# Patient Record
Sex: MALE | URBAN - METROPOLITAN AREA
[De-identification: names, ages, dates, MRNs, and addresses within clinical notes are randomized per-mention and may not be internally consistent; named-entity substitution may affect disease eponyms.]

---

## 2019-10-11 ENCOUNTER — APPOINTMENT (RX ONLY)
Dept: URBAN - METROPOLITAN AREA CLINIC 74 | Facility: CLINIC | Age: 29
Setting detail: DERMATOLOGY
End: 2019-10-11

## 2019-10-11 DIAGNOSIS — Z71.89 OTHER SPECIFIED COUNSELING: ICD-10-CM

## 2019-10-11 DIAGNOSIS — L81.4 OTHER MELANIN HYPERPIGMENTATION: ICD-10-CM

## 2019-10-11 DIAGNOSIS — L42 PITYRIASIS ROSEA: ICD-10-CM

## 2019-10-11 DIAGNOSIS — D22 MELANOCYTIC NEVI: ICD-10-CM

## 2019-10-11 DIAGNOSIS — D485 NEOPLASM OF UNCERTAIN BEHAVIOR OF SKIN: ICD-10-CM

## 2019-10-11 PROBLEM — D22.5 MELANOCYTIC NEVI OF TRUNK: Status: ACTIVE | Noted: 2019-10-11

## 2019-10-11 PROBLEM — D23.62 OTHER BENIGN NEOPLASM OF SKIN OF LEFT UPPER LIMB, INCLUDING SHOULDER: Status: ACTIVE | Noted: 2019-10-11

## 2019-10-11 PROBLEM — D48.5 NEOPLASM OF UNCERTAIN BEHAVIOR OF SKIN: Status: ACTIVE | Noted: 2019-10-11

## 2019-10-11 PROCEDURE — ? TREATMENT REGIMEN

## 2019-10-11 PROCEDURE — ? OBSERVATION AND MEASURE

## 2019-10-11 PROCEDURE — ? OTHER

## 2019-10-11 PROCEDURE — ? COUNSELING

## 2019-10-11 PROCEDURE — 99203 OFFICE O/P NEW LOW 30 MIN: CPT

## 2019-10-11 ASSESSMENT — SEVERITY ASSESSMENT: SEVERITY: MODERATE

## 2019-10-11 ASSESSMENT — LOCATION SIMPLE DESCRIPTION DERM
LOCATION SIMPLE: RIGHT UPPER BACK
LOCATION SIMPLE: ABDOMEN
LOCATION SIMPLE: LEFT UPPER BACK

## 2019-10-11 ASSESSMENT — LOCATION DETAILED DESCRIPTION DERM
LOCATION DETAILED: RIGHT LATERAL ABDOMEN
LOCATION DETAILED: RIGHT SUPERIOR UPPER BACK
LOCATION DETAILED: RIGHT RIB CAGE
LOCATION DETAILED: LEFT SUPERIOR UPPER BACK
LOCATION DETAILED: PERIUMBILICAL SKIN
LOCATION DETAILED: RIGHT INFERIOR MEDIAL UPPER BACK

## 2019-10-11 ASSESSMENT — LOCATION ZONE DERM: LOCATION ZONE: TRUNK

## 2019-10-11 NOTE — HPI: EVALUATION OF SKIN LESION(S)
What Type Of Note Output Would You Prefer (Optional)?: Standard Output
How Severe Are Your Spot(S)?: moderate
Have Your Spot(S) Been Treated In The Past?: has not been treated
Hpi Title: Evaluation of Skin Lesions
Additional History: Patient would like moles examined and notes a new papule on his left dorsal hand.

## 2019-10-11 NOTE — PROCEDURE: MIPS QUALITY
Quality 131: Pain Assessment And Follow-Up: Pain assessment using a standardized tool is documented as negative, no follow-up plan required
Quality 130: Documentation Of Current Medications In The Medical Record: Current Medications Documented
Quality 110: Preventive Care And Screening: Influenza Immunization: Influenza Immunization Administered during Influenza season
Detail Level: Detailed
Quality 226: Preventive Care And Screening: Tobacco Use: Screening And Cessation Intervention: Patient screened for tobacco use and is an ex/non-smoker

## 2019-10-11 NOTE — PROCEDURE: OTHER
Other (Free Text): Discussed the option for excision if lesion bothers patient. He will consider this and call back office if he’d like the dermatofibroma removed. Patient was quoted the cost of $137.87.
Note Text (......Xxx Chief Complaint.): This diagnosis correlates with the
Detail Level: Zone

## 2019-10-11 NOTE — PROCEDURE: TREATMENT REGIMEN
Plan: -discussed options: patient can take antibiotics or wait rash out; he decided to wait rash out \\n-discussed sunlight could help rash \\n-told patient he can call office if rash gets worse
Otc Regimen: Hydrocortisone cream QD
Detail Level: Zone

## 2022-01-07 ENCOUNTER — APPOINTMENT (RX ONLY)
Dept: URBAN - METROPOLITAN AREA CLINIC 74 | Facility: CLINIC | Age: 32
Setting detail: DERMATOLOGY
End: 2022-01-07

## 2022-01-07 DIAGNOSIS — A63.0 ANOGENITAL (VENEREAL) WARTS: ICD-10-CM

## 2022-01-07 PROCEDURE — ? COUNSELING

## 2022-01-07 PROCEDURE — 54056 CRYOSURGERY PENIS LESION(S): CPT

## 2022-01-07 PROCEDURE — ? LIQUID NITROGEN

## 2022-01-07 PROCEDURE — 17110 DESTRUCTION B9 LES UP TO 14: CPT

## 2022-01-07 PROCEDURE — ? LIQUID NITROGEN GENITALS

## 2022-01-07 ASSESSMENT — LOCATION ZONE DERM
LOCATION ZONE: PENIS
LOCATION ZONE: LEG
LOCATION ZONE: TRUNK

## 2022-01-07 ASSESSMENT — LOCATION SIMPLE DESCRIPTION DERM
LOCATION SIMPLE: PENIS
LOCATION SIMPLE: RIGHT SUPRAPUBIC REGION
LOCATION SIMPLE: RIGHT THIGH
LOCATION SIMPLE: GROIN

## 2022-01-07 ASSESSMENT — LOCATION DETAILED DESCRIPTION DERM
LOCATION DETAILED: RIGHT SUPRAPUBIC REGION
LOCATION DETAILED: RIGHT DORSAL SHAFT OF PENIS
LOCATION DETAILED: RIGHT ANTERIOR PROXIMAL THIGH
LOCATION DETAILED: SUPRAPUBIC SKIN

## 2022-01-07 NOTE — HPI: SKIN LESION
Is This A New Presentation, Or A Follow-Up?: Skin Lesions
How Severe Is Your Skin Lesion?: moderate
Has Your Skin Lesion Been Treated?: not been treated
Additional History: Pt presents for 2 blister-like lesions on groin. Pt reports attempting to pop lesions without success.

## 2022-01-07 NOTE — PROCEDURE: COUNSELING
Patient Specific Counseling (Will Not Stick From Patient To Patient): - pt denies spreading\\n- disc LN2 (r/b/se)\\n- disc additional therapy may be necessary if unresolved\\n- provided pt with molluscum contagiousum info brochure\\n- RTC 1 mo
Detail Level: Detailed

## 2022-01-07 NOTE — PROCEDURE: MIPS QUALITY
Quality 130: Documentation Of Current Medications In The Medical Record: Current Medications Documented
Quality 110: Preventive Care And Screening: Influenza Immunization: Influenza immunization was not ordered or administered, reason not given
Detail Level: Detailed
Additional Notes: COVID vaccination received

## 2022-01-07 NOTE — PROCEDURE: LIQUID NITROGEN
Spray Paint Technique: No
Medical Necessity Clause: This procedure was medically necessary because the lesions that were treated were:
Show Spray Paint Technique Variable?: Yes
Medical Necessity Information: It is in your best interest to select a reason for this procedure from the list below. All of these items fulfill various CMS LCD requirements except the new and changing color options.
Name band;
Post-Care Instructions: I reviewed with the patient in detail post-care instructions. Patient is to wear sunprotection, and avoid picking at any of the treated lesions. Pt may apply Vaseline to crusted or scabbing areas.
Duration Of Freeze Thaw-Cycle (Seconds): 5-10
Detail Level: Detailed
Consent: The patient's consent was obtained including but not limited to risks of crusting, scabbing, blistering, scarring, darker or lighter pigmentary change, recurrence, incomplete removal and infection.
Spray Paint Text: The liquid nitrogen was applied to the skin utilizing a spray paint frosting technique.
Number Of Freeze-Thaw Cycles: 3 freeze-thaw cycles

## 2022-01-07 NOTE — PROCEDURE: LIQUID NITROGEN GENITALS
Post-Care Instructions: I reviewed with the patient in detail post-care instructions. Patient is to wear sunprotection, and avoid picking at any of the treated lesions. Pt may apply Vaseline to crusted or scabbing areas.
Render Post-Care Instructions In Note?: yes
Consent: The patient's consent was obtained including but not limited to risks of crusting, scabbing, blistering, scarring, darker or lighter pigmentary change, recurrence, incomplete removal and infection.
Detail Level (Bills Only For Genitals Or Anus, Choose Benign Destruction If You Are Treating A Different Area): Simple

## 2022-02-18 ENCOUNTER — APPOINTMENT (RX ONLY)
Dept: URBAN - METROPOLITAN AREA CLINIC 74 | Facility: CLINIC | Age: 32
Setting detail: DERMATOLOGY
End: 2022-02-18

## 2022-02-18 DIAGNOSIS — L81.0 POSTINFLAMMATORY HYPERPIGMENTATION: ICD-10-CM

## 2022-02-18 DIAGNOSIS — B07.8 OTHER VIRAL WARTS: ICD-10-CM

## 2022-02-18 PROCEDURE — ? LIQUID NITROGEN

## 2022-02-18 PROCEDURE — ? COUNSELING

## 2022-02-18 PROCEDURE — 99212 OFFICE O/P EST SF 10 MIN: CPT | Mod: 25

## 2022-02-18 PROCEDURE — 17110 DESTRUCTION B9 LES UP TO 14: CPT

## 2022-02-18 PROCEDURE — ? DIAGNOSIS COMMENT

## 2022-02-18 ASSESSMENT — LOCATION SIMPLE DESCRIPTION DERM
LOCATION SIMPLE: GENITALIA
LOCATION SIMPLE: RIGHT INDEX FINGER

## 2022-02-18 ASSESSMENT — LOCATION ZONE DERM
LOCATION ZONE: GENITALIA
LOCATION ZONE: FINGER

## 2022-02-18 ASSESSMENT — LOCATION DETAILED DESCRIPTION DERM
LOCATION DETAILED: GENITALIA
LOCATION DETAILED: RIGHT MID DORSAL INDEX FINGER

## 2022-02-18 NOTE — PROCEDURE: LIQUID NITROGEN
Include Z78.9 (Other Specified Conditions Influencing Health Status) As An Associated Diagnosis?: No
Post-Care Instructions: I reviewed with the patient in detail post-care instructions. Patient is to wear sunprotection, and avoid picking at any of the treated lesions. Pt may apply Vaseline to crusted or scabbing areas.
Show Aperture Variable?: Yes
Medical Necessity Clause: This procedure was medically necessary because the lesions that were treated were:
Detail Level: Detailed
Consent: The patient's consent was obtained including but not limited to risks of crusting, scabbing, blistering, scarring, darker or lighter pigmentary change, recurrence, incomplete removal and infection.
Medical Necessity Information: It is in your best interest to select a reason for this procedure from the list below. All of these items fulfill various CMS LCD requirements except the new and changing color options.
Number Of Freeze-Thaw Cycles: 3 freeze-thaw cycles
Spray Paint Text: The liquid nitrogen was applied to the skin utilizing a spray paint frosting technique.
Duration Of Freeze Thaw-Cycle (Seconds): 5-10

## 2022-02-18 NOTE — PROCEDURE: COUNSELING
Detail Level: Detailed
Patient Specific Counseling (Will Not Stick From Patient To Patient): - from LN tx, will improve with time
Patient Specific Counseling (Will Not Stick From Patient To Patient): - pt reports irritation, wants treated

## 2024-02-14 NOTE — PROCEDURE: COUNSELING
Detail Level: Simple
Patient acknowledges and states understanding of plan   Patient to take medication as prescribed   -with antibiotics take daily with food take a probiotic or eat a yogurt daily to prevent GI upset   For congestion can take, if needed:   -Mucinex  -take Allegra, Claritin, or Zyrtec daily   -use the nasal spray regularly for the next 2-4 weeks  Patient to drink plenty of fluids   Encourage hand washing  Patient can drink warm tea with honey or do warm salt water gargles for sore throat   Can take Advil, Aleve, Ibuprofen or Tylenol as needed for aches and pains   Can take one 25 mg Benadryl 45 minutes prior to bed for postnasal drip if no improvement in symptoms with cough medication  Patient can use saline nasal spray, neti pot (sinus rinse), and humidifier at the bedside to help with congestion   Strep- change tooth brush 24-48 hours after starting antibiotic   Follow up if no improvement    Follow up with PCP for blood work as planned     
Detail Level: Generalized
Detail Level: Detailed